# Patient Record
Sex: MALE | Race: WHITE | Employment: UNEMPLOYED | URBAN - METROPOLITAN AREA
[De-identification: names, ages, dates, MRNs, and addresses within clinical notes are randomized per-mention and may not be internally consistent; named-entity substitution may affect disease eponyms.]

---

## 2021-02-23 ENCOUNTER — APPOINTMENT (OUTPATIENT)
Dept: GENERAL RADIOLOGY | Age: 61
End: 2021-02-23
Attending: PHYSICIAN ASSISTANT

## 2021-02-23 ENCOUNTER — HOSPITAL ENCOUNTER (EMERGENCY)
Age: 61
Discharge: HOME | End: 2021-02-23
Attending: EMERGENCY MEDICINE

## 2021-02-23 VITALS
TEMPERATURE: 98.7 F | RESPIRATION RATE: 16 BRPM | HEART RATE: 67 BPM | SYSTOLIC BLOOD PRESSURE: 152 MMHG | OXYGEN SATURATION: 100 % | DIASTOLIC BLOOD PRESSURE: 69 MMHG | HEIGHT: 66 IN | BODY MASS INDEX: 16.07 KG/M2 | WEIGHT: 100 LBS

## 2021-02-23 DIAGNOSIS — S62.91XA CLOSED FRACTURE OF RIGHT HAND, INITIAL ENCOUNTER: Primary | ICD-10-CM

## 2021-02-23 DIAGNOSIS — S61.411A LACERATION OF RIGHT HAND WITHOUT FOREIGN BODY, INITIAL ENCOUNTER: ICD-10-CM

## 2021-02-23 PROCEDURE — 75810000301 HC ER LEVEL 1 CLOSED TREATMNT FRACTURE/DISLOCATION

## 2021-02-23 PROCEDURE — 73130 X-RAY EXAM OF HAND: CPT

## 2021-02-23 PROCEDURE — 74011000250 HC RX REV CODE- 250: Performed by: PHYSICIAN ASSISTANT

## 2021-02-23 PROCEDURE — 90471 IMMUNIZATION ADMIN: CPT

## 2021-02-23 PROCEDURE — 74011250637 HC RX REV CODE- 250/637: Performed by: PHYSICIAN ASSISTANT

## 2021-02-23 PROCEDURE — 73564 X-RAY EXAM KNEE 4 OR MORE: CPT

## 2021-02-23 PROCEDURE — 99284 EMERGENCY DEPT VISIT MOD MDM: CPT

## 2021-02-23 PROCEDURE — 75810000293 HC SIMP/SUPERF WND  RPR

## 2021-02-23 PROCEDURE — 90715 TDAP VACCINE 7 YRS/> IM: CPT | Performed by: PHYSICIAN ASSISTANT

## 2021-02-23 PROCEDURE — 74011250636 HC RX REV CODE- 250/636: Performed by: PHYSICIAN ASSISTANT

## 2021-02-23 RX ORDER — BACITRACIN ZINC 500 UNIT/G
OINTMENT (GRAM) TOPICAL
Status: COMPLETED | OUTPATIENT
Start: 2021-02-23 | End: 2021-02-23

## 2021-02-23 RX ORDER — HYDROCODONE BITARTRATE AND ACETAMINOPHEN 5; 325 MG/1; MG/1
1 TABLET ORAL
Qty: 12 TAB | Refills: 0 | Status: SHIPPED | OUTPATIENT
Start: 2021-02-23 | End: 2021-02-26

## 2021-02-23 RX ORDER — HYDROCODONE BITARTRATE AND ACETAMINOPHEN 5; 325 MG/1; MG/1
1 TABLET ORAL
Status: COMPLETED | OUTPATIENT
Start: 2021-02-23 | End: 2021-02-23

## 2021-02-23 RX ORDER — CEPHALEXIN 500 MG/1
500 CAPSULE ORAL 4 TIMES DAILY
Qty: 40 CAP | Refills: 0 | Status: SHIPPED | OUTPATIENT
Start: 2021-02-23 | End: 2021-03-05

## 2021-02-23 RX ORDER — LIDOCAINE HYDROCHLORIDE 10 MG/ML
10 INJECTION, SOLUTION EPIDURAL; INFILTRATION; INTRACAUDAL; PERINEURAL ONCE
Status: COMPLETED | OUTPATIENT
Start: 2021-02-23 | End: 2021-02-23

## 2021-02-23 RX ADMIN — Medication: at 14:22

## 2021-02-23 RX ADMIN — LIDOCAINE HYDROCHLORIDE 10 ML: 10 INJECTION, SOLUTION EPIDURAL; INFILTRATION; INTRACAUDAL; PERINEURAL at 14:42

## 2021-02-23 RX ADMIN — HYDROCODONE BITARTRATE AND ACETAMINOPHEN 1 TABLET: 5; 325 TABLET ORAL at 13:29

## 2021-02-23 RX ADMIN — TETANUS TOXOID, REDUCED DIPHTHERIA TOXOID AND ACELLULAR PERTUSSIS VACCINE, ADSORBED 0.5 ML: 5; 2.5; 8; 8; 2.5 SUSPENSION INTRAMUSCULAR at 13:29

## 2021-02-23 NOTE — ED TRIAGE NOTES
Patient states he fell of of ladder and left hand got caught somewhere. He has bleeding to right 4th and 5th digits.   He states tetanus is probably not UTD

## 2021-02-23 NOTE — ED NOTES
Earnest Golden is a 63 y.o. male that was discharged in stable condition. The patients diagnosis, condition and treatment were explained to  patient and aftercare instructions were given. The patient verbalized understanding. Patient armband removed and shredded.

## 2021-02-24 NOTE — ED PROVIDER NOTES
[address] EMERGENCY DEPT    Date: 2/23/2021  Patient Name: Kip Vogel    History of Presenting Illness     Chief Complaint   Patient presents with    Laceration     63 y.o. male otherwise healthy presents the ED complaining of right hand injury and left knee pain onset prior to arrival.  Patient states he was on a ladder when he fell, landing on his hands and knees. He reports having a deformity and constant severe throbbing to his right hand. Patient did not take anything for his pain. Does not recall his last tetanus and has a laceration to his right fifth digit. He did not hit his head or have any LOC. Denies any numbness, weakness, back pain, abdominal pain or chest pain, no symptoms at this time. Patient denies any other associated signs or symptoms. Patient denies any other complaints. Nursing notes regarding the HPI and triage nursing notes were reviewed. Prior medical records were reviewed. Current Outpatient Medications   Medication Sig Dispense Refill    HYDROcodone-acetaminophen (NORCO) 5-325 mg per tablet Take 1 Tab by mouth every six (6) hours as needed for Pain for up to 3 days. Max Daily Amount: 4 Tabs. 12 Tab 0    cephALEXin (Keflex) 500 mg capsule Take 1 Cap by mouth four (4) times daily for 10 days. 40 Cap 0       Past History     Past Medical History:  History reviewed. No pertinent past medical history. Past Surgical History:  No past surgical history on file. Family History:  History reviewed. No pertinent family history. Social History:  Social History     Tobacco Use    Smoking status: Not on file   Substance Use Topics    Alcohol use: Not on file    Drug use: Not on file       Allergies:  No Known Allergies    Patient's primary care provider (as noted in EPIC):  None    Review of Systems   Constitutional:  Denies malaise, fever, chills. Head:  Denies injury. Face:  Denies injury or pain. Neck:  Denies injury or pain.    Chest:  Denies injury. Cardiac:  Denies chest pain   Respiratory:  Denies shortness of breath. Back:  Denies injury or pain. Pelvis:  Denies injury or pain. Extremity/MS: + right hand pain/deformity. Neuro:  Denies headache, LOC, dizziness, neurologic symptoms/deficits/paresthesias. Skin: Denies injury, rash, itching or skin changes. All other systems negative as reviewed. Visit Vitals  BP (!) 152/69 (BP 1 Location: Left upper arm, BP Patient Position: At rest)   Pulse 67   Temp 98.7 °F (37.1 °C)   Resp 16   Ht 5' 6\" (1.676 m)   Wt 45.4 kg (100 lb)   SpO2 100%   BMI 16.14 kg/m²       PHYSICAL EXAM:    CONSTITUTIONAL:  Alert, in no apparent distress;  well developed;  well nourished. HEAD:  Normocephalic, atraumatic. EYES:  EOMI. Non-icteric sclera. Normal conjunctiva. ENTM:  Nose:  no rhinorrhea. Throat:  no erythema or exudate, mucous membranes moist.  NECK: Supple  RESPIRATORY:  Chest clear, equal breath sounds, good air movement. CARDIOVASCULAR:  Regular rate and rhythm. No murmurs, rubs, or gallops. GI:  Normal bowel sounds, abdomen soft and non-tender. No rebound or guarding. BACK:  Non-tender. UPPER EXT:  Right hand dorsal ulnar aspect with deformity present; NVI distally. LOWER EXT: Left knee with mild TTP, pain on ambulating; NVI distally with 5/5 strength. Ligaments intact and without pain on stressing. NEURO:  Moves all four extremities, and grossly normal motor exam.  SKIN: Right 5th digit with 5cm laceration present; NVI distally with 5/5 strength. PSYCH:  Alert and normal affect. PROCEDURE NOTE:   LACERATION REPAIR    Laceration Repair Site: Right 5th digit  Digital block anesthetic:  Lidocaine 1%, without epi, 2cc's used  Laceration length:  5cm    The area was prepped with Betadine solution. Noted anesthetic was injected locally for digital block anesthesia. The laceration was cleaned with wound cleanser and no debris was removed with wound scrubbing and/or wound irrigation. The noted laceration(s) was/were repaired with sutures. Simple Interrupted sutures:  5 placed 4-0 ethilon   Patient tolerated the procedure well. Tetanus was updated. Reduction of Joint    Date/Time: 2/24/2021 7:00 PM  Performed by: ULISSES Andrade Do  Authorized by: ULISSES Andrade Do     Consent:     Consent obtained:  Verbal  Injury:     Injury location:  Hand    Hand injury location:  R hand    Hand fracture type: fourth metacarpal    Pre-procedure assessment:     Neurological function: normal      Distal perfusion: normal      Range of motion: reduced    Anesthesia (see MAR for exact dosages): Anesthesia method: hematoma block   Procedure details:     Skeletal traction used: yes      Pin inserted: no      Reduction successful: yes      X-ray confirmed reduction: yes      Immobilization:  Splint    Splint type:  Ulnar gutter    Supplies used:  Ortho-Glass  Post-procedure assessment:     Neurological function: normal      Distal perfusion: normal      Range of motion: unchanged      Patient tolerance of procedure: Tolerated well, no immediate complications      Consult with Dr. Moisés Land who states he will see the patient in the office for f/u. IMPRESSION  1. Fracture of the fourth metacarpal at the base, with posterior dislocation /  angulation. 2. Posterior dislocation of the fifth metacarpal. Fracture of the fifth proximal  phalanx. Questionable foreign body at the distal fifth finger. Post reduction: Total reduction at the fourth and fifth metacarpals    Rx for percocet and keflex (for laceration). Strict instruction to f/u with ortho and return immediately for any acute worsening. Dr. Bonilla Denney has seen and evaluated the patient as well. Diagnosis:   1. Closed fracture of right hand, initial encounter    2.  Laceration of right hand without foreign body, initial encounter      Disposition: Discharge    Follow-up Information     Follow up With Specialties Details Why Contact Info    Diana Webb MD Orthopedic Surgery In 2 days  [address]  [address] [place] [address] [address] [address] [phone] [address] [facility] EMERGENCY DEPT Emergency Medicine  In 10 days for suture removal, or sooner if symptoms worsen [address] [facility]  [phone]          Discharge Medication List as of 2/23/2021  4:03 PM      START taking these medications    Details   HYDROcodone-acetaminophen (NORCO) 5-325 mg per tablet Take 1 Tab by mouth every six (6) hours as needed for Pain for up to 3 days. Max Daily Amount: 4 Tabs., Normal, Disp-12 Tab, R-0      cephALEXin (Keflex) 500 mg capsule Take 1 Cap by mouth four (4) times daily for 10 days. , Normal, Disp-40 Cap, R-0           Connie Tinajero

## 2021-02-25 ENCOUNTER — TELEPHONE (OUTPATIENT)
Dept: ORTHOPEDIC SURGERY | Age: 61
End: 2021-02-25

## 2021-02-25 NOTE — TELEPHONE ENCOUNTER
Patient was seen in ED 2/23 for laceration and hand fracture and advised to follow up in 2 days. He lives in [place] and cannot come into office until next week. He's unsure what was to be done at 2 day follow up and isn't sure if it can wait until next week or if he should see someone in [place]. Please contact patient to advise or instruct about wound care, [phone].

## 2021-02-26 NOTE — TELEPHONE ENCOUNTER
He is free to see whoever he wants. We did not formally see patient so cannot recommend wound care. He should follow instructions from ER .  We can see him next week if he wants

## 2021-03-05 ENCOUNTER — OFFICE VISIT (OUTPATIENT)
Dept: ORTHOPEDIC SURGERY | Age: 61
End: 2021-03-05

## 2021-03-05 VITALS
TEMPERATURE: 97 F | RESPIRATION RATE: 18 BRPM | OXYGEN SATURATION: 100 % | DIASTOLIC BLOOD PRESSURE: 71 MMHG | WEIGHT: 101 LBS | SYSTOLIC BLOOD PRESSURE: 147 MMHG | HEIGHT: 65 IN | HEART RATE: 71 BPM | BODY MASS INDEX: 16.83 KG/M2

## 2021-03-05 DIAGNOSIS — S62.616A CLOSED DISPLACED FRACTURE OF PROXIMAL PHALANX OF RIGHT LITTLE FINGER, INITIAL ENCOUNTER: ICD-10-CM

## 2021-03-05 DIAGNOSIS — S62.344A CLOSED NONDISPLACED FRACTURE OF BASE OF FOURTH METACARPAL BONE OF RIGHT HAND, INITIAL ENCOUNTER: Primary | ICD-10-CM

## 2021-03-05 DIAGNOSIS — S63.054A CLOSED DISLOCATION OF FIFTH CARPOMETACARPAL JOINT OF RIGHT HAND, INITIAL ENCOUNTER: ICD-10-CM

## 2021-03-05 DIAGNOSIS — S61.216A LACERATION OF RIGHT LITTLE FINGER WITHOUT FOREIGN BODY WITHOUT DAMAGE TO NAIL, INITIAL ENCOUNTER: ICD-10-CM

## 2021-03-05 PROCEDURE — 73130 X-RAY EXAM OF HAND: CPT | Performed by: ORTHOPAEDIC SURGERY

## 2021-03-05 PROCEDURE — 99203 OFFICE O/P NEW LOW 30 MIN: CPT | Performed by: ORTHOPAEDIC SURGERY

## 2021-03-07 NOTE — PROGRESS NOTES
Joss Mercado is a 63 y.o. male right handed . Worker's Compensation and legal considerations: none filed. Vitals:    03/05/21 1337   BP: (!) 147/71   Pulse: 71   Resp: 18   Temp: 97 °F (36.1 °C)   SpO2: 100%   Weight: 101 lb (45.8 kg)   Height: 5' 4.5\" (1.638 m)   PainSc:   1           Chief Complaint   Patient presents with    Hand Pain     right         HPI: Patient presents today for ER follow-up after an injury to his right hand over a week prior. He had lacerations which were sutured and he was placed into a splint after closed reduction of his fracture dislocation at the hand. Date of onset: 2/23/2021    Injury: Yes: Comment: Crush    Prior Treatment:  Yes: Comment: ED laceration closure reduction of fracture and splinting    Numbness/ Tingling: No      ROS: Review of Systems - General ROS: negative  Psychological ROS: negative  ENT ROS: negative  Allergy and Immunology ROS: negative  Hematological and Lymphatic ROS: negative  Respiratory ROS: no cough, shortness of breath, or wheezing  Cardiovascular ROS: no chest pain or dyspnea on exertion  Gastrointestinal ROS: no abdominal pain, change in bowel habits, or black or bloody stools  Musculoskeletal ROS: positive for - pain in hand - right and swelling in hand - right  Neurological ROS: negative  Dermatological ROS: negative    History reviewed. No pertinent past medical history. History reviewed. No pertinent surgical history. No Known Allergies        PE:     Physical Exam  Vitals signs and nursing note reviewed. Constitutional:       General: He is not in acute distress. Appearance: Normal appearance. He is not ill-appearing. Neck:      Musculoskeletal: Normal range of motion and neck supple. Cardiovascular:      Pulses: Normal pulses. Musculoskeletal:         General: Swelling, tenderness and signs of injury present. No deformity. Right lower leg: No edema. Left lower leg: No edema.    Skin:     General: Skin is warm and dry. Capillary Refill: Capillary refill takes less than 2 seconds. Findings: Bruising present. No erythema. Neurological:      General: No focal deficit present. Mental Status: He is alert and oriented to person, place, and time. Psychiatric:         Mood and Affect: Mood normal.         Behavior: Behavior normal.            Right hand: There is a laceration over the small finger dorsally that has been closed. There is no signs of erythema drainage or any other signs of infection. There is tenderness to palpation and edema about the proximal aspect of the right small finger. There is also edema noted over the dorsal ulnar proximal aspect of the hand. Range of motion is limited due to pain, swelling, and stiffness. There is no scissoring or rotational deformity noted of the digits. Imaging:     3/5/2021 3 views of right hand show significant interval improvement in the fractures of the fourth metacarpal base and small finger proximal phalanx as well as the CMC dislocation of the fifth carpometacarpal joint. This is when compared to emergency department injury films. ICD-10-CM ICD-9-CM    1. Closed nondisplaced fracture of base of fourth metacarpal bone of right hand, initial encounter  S62.344A 815.02 AMB POC XRAY, HAND; 3+ VIEWS   2. Closed dislocation of fifth carpometacarpal joint of right hand, initial encounter  S63.054A 833.04 AMB SUPPLY ORDER   3. Closed displaced fracture of proximal phalanx of right little finger, initial encounter  S62.616A 816.01 AMB SUPPLY ORDER   4. Laceration of right little finger without foreign body without damage to nail, initial encounter  S61.216A 883.0 AMB SUPPLY ORDER         Plan: At this point given the marked improvements in alignment of the fractures and dislocation as well as maintenance of this alignment in the splint, I will have the patient placed into a brace that is to be worn like a cast for the next week.   We will reevaluate next week with x-rays to determine if this fracture is amenable to conservative treatment. Additionally next week will remove sutures. If the fracture alignment is maintained we will closely follow over the next several weeks with imaging. Follow-up and Dispositions    · Return in about 1 week (around 3/12/2021) for Reevaluation, wound check, suture removal, and repeat x-rays.           Plan was reviewed with patient, who verbalized agreement and understanding of the plan

## 2021-03-12 ENCOUNTER — OFFICE VISIT (OUTPATIENT)
Dept: ORTHOPEDIC SURGERY | Age: 61
End: 2021-03-12

## 2021-03-12 VITALS
HEART RATE: 75 BPM | OXYGEN SATURATION: 99 % | WEIGHT: 101.2 LBS | DIASTOLIC BLOOD PRESSURE: 60 MMHG | SYSTOLIC BLOOD PRESSURE: 155 MMHG | BODY MASS INDEX: 17.1 KG/M2 | TEMPERATURE: 98.5 F

## 2021-03-12 DIAGNOSIS — S63.054D CLOSED DISLOCATION OF FIFTH CARPOMETACARPAL JOINT OF RIGHT HAND, SUBSEQUENT ENCOUNTER: ICD-10-CM

## 2021-03-12 DIAGNOSIS — S62.616D CLOSED DISPLACED FRACTURE OF PROXIMAL PHALANX OF RIGHT LITTLE FINGER WITH ROUTINE HEALING, SUBSEQUENT ENCOUNTER: ICD-10-CM

## 2021-03-12 DIAGNOSIS — S61.216D LACERATION OF RIGHT LITTLE FINGER WITHOUT FOREIGN BODY WITHOUT DAMAGE TO NAIL, SUBSEQUENT ENCOUNTER: ICD-10-CM

## 2021-03-12 DIAGNOSIS — S62.344D CLOSED NONDISPLACED FRACTURE OF BASE OF FOURTH METACARPAL BONE OF RIGHT HAND WITH ROUTINE HEALING, SUBSEQUENT ENCOUNTER: Primary | ICD-10-CM

## 2021-03-12 PROCEDURE — 73130 X-RAY EXAM OF HAND: CPT | Performed by: ORTHOPAEDIC SURGERY

## 2021-03-12 PROCEDURE — 99213 OFFICE O/P EST LOW 20 MIN: CPT | Performed by: ORTHOPAEDIC SURGERY

## 2021-03-12 NOTE — PROGRESS NOTES
Everette Rehman is a 63 y.o. male right handed . Worker's Compensation and legal considerations: none filed. Vitals:    03/12/21 0843   BP: (!) 155/60   Pulse: 75   Temp: 98.5 °F (36.9 °C)   TempSrc: Skin   SpO2: 99%   Weight: 101 lb 3.2 oz (45.9 kg)   PainSc:   1   PainLoc: Hand           Chief Complaint   Patient presents with    Hand Pain     right       HPI: Patient presents today for follow-up of his right fourth metacarpal base fracture, fifth CMC joint dislocation and small finger proximal phalanx fracture. He has been in a TKO brace his previous visit. He reports pain to be much improved. Initial HPI: Patient presents today for ER follow-up after an injury to his right hand over a week prior. He had lacerations which were sutured and he was placed into a splint after closed reduction of his fracture dislocation at the hand. Date of onset: 2/23/2021    Injury: Yes: Comment: Crush    Prior Treatment:  Yes: Comment: ED laceration closure reduction of fracture and splinting, TKO Brace    Numbness/ Tingling: No      ROS: Review of Systems - General ROS: negative  Psychological ROS: negative  ENT ROS: negative  Allergy and Immunology ROS: negative  Hematological and Lymphatic ROS: negative  Respiratory ROS: no cough, shortness of breath, or wheezing  Cardiovascular ROS: no chest pain or dyspnea on exertion  Gastrointestinal ROS: no abdominal pain, change in bowel habits, or black or bloody stools  Musculoskeletal ROS: positive for - pain in hand - right and swelling in hand - right  Neurological ROS: negative  Dermatological ROS: negative    No past medical history on file. No past surgical history on file. No Known Allergies        PE:     Physical Exam  Vitals signs and nursing note reviewed. Constitutional:       General: He is not in acute distress. Appearance: Normal appearance. He is not ill-appearing. Neck:      Musculoskeletal: Normal range of motion and neck supple. Cardiovascular:      Pulses: Normal pulses. Musculoskeletal:         General: Swelling and tenderness present. No deformity or signs of injury. Right lower leg: No edema. Left lower leg: No edema. Skin:     General: Skin is warm and dry. Capillary Refill: Capillary refill takes less than 2 seconds. Findings: No bruising or erythema. Neurological:      General: No focal deficit present. Mental Status: He is alert and oriented to person, place, and time. Psychiatric:         Mood and Affect: Mood normal.         Behavior: Behavior normal.            Right hand: Laceration over the little finger show significant healing with sutures still intact. Ecchymosis has significantly improved. There is no signs of erythema or infection noted about the hand. Sensation is grossly intact distally and cap refill is brisk. Imaging:     3/12/2021 3 views of right hand does not show any interval displacement nor callus formation since previous x-rays. CMC dislocation appears to have maintained its reduction in the fourth metacarpal base has not displaced. Additionally the right long finger proximal phalanx fracture has not displaced. 3/5/2021 3 views of right hand show significant interval improvement in the fractures of the fourth metacarpal base and small finger proximal phalanx as well as the CMC dislocation of the fifth carpometacarpal joint. This is when compared to emergency department injury films. ICD-10-CM ICD-9-CM    1. Closed nondisplaced fracture of base of fourth metacarpal bone of right hand with routine healing, subsequent encounter  S62.344D V54.19 AMB POC XRAY, HAND; 3+ VIEWS   2. Closed dislocation of fifth carpometacarpal joint of right hand, subsequent encounter  S63.054D V58.89    3. Closed displaced fracture of proximal phalanx of right little finger with routine healing, subsequent encounter  S62.616D V54.19    4.  Laceration of right little finger without foreign body without damage to nail, subsequent encounter  S61.216D V58.89          Plan: At this point patient is continuing to have maintained reduction of this fractures and dislocation. His wound is healing nicely without any evidence of infection. We will continue TKO brace but have him take it off once daily for showering as well as to do gentle range of motion exercises. Follow-up and Dispositions    · Return in about 2 weeks (around 3/26/2021) for Reevaluation and x-rays.           Plan was reviewed with patient, who verbalized agreement and understanding of the plan

## 2024-12-12 ENCOUNTER — NEW PATIENT (OUTPATIENT)
Age: 64
End: 2024-12-12

## 2024-12-12 DIAGNOSIS — H25.13: ICD-10-CM

## 2024-12-12 PROCEDURE — 92004 COMPRE OPH EXAM NEW PT 1/>: CPT
